# Patient Record
Sex: FEMALE | Race: WHITE | URBAN - METROPOLITAN AREA
[De-identification: names, ages, dates, MRNs, and addresses within clinical notes are randomized per-mention and may not be internally consistent; named-entity substitution may affect disease eponyms.]

---
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
## 2025-01-07 ENCOUNTER — OFFICE VISIT (OUTPATIENT)
Age: 20
End: 2025-01-07

## 2025-01-07 VITALS
SYSTOLIC BLOOD PRESSURE: 130 MMHG | DIASTOLIC BLOOD PRESSURE: 80 MMHG | TEMPERATURE: 97.7 F | OXYGEN SATURATION: 95 % | WEIGHT: 252 LBS | HEART RATE: 71 BPM | BODY MASS INDEX: 46.38 KG/M2 | HEIGHT: 62 IN

## 2025-01-07 DIAGNOSIS — H10.32 ACUTE BACTERIAL CONJUNCTIVITIS OF LEFT EYE: Primary | ICD-10-CM

## 2025-01-07 RX ORDER — PREDNISONE 20 MG/1
40 TABLET ORAL DAILY
Qty: 10 TABLET | Refills: 0 | Status: SHIPPED | OUTPATIENT
Start: 2025-01-07 | End: 2025-01-12

## 2025-01-07 RX ORDER — TOBRAMYCIN 3 MG/ML
1 SOLUTION/ DROPS OPHTHALMIC EVERY 4 HOURS
Qty: 5 ML | Refills: 0 | Status: SHIPPED | OUTPATIENT
Start: 2025-01-07 | End: 2025-01-14

## 2025-01-07 ASSESSMENT — ENCOUNTER SYMPTOMS
RHINORRHEA: 0
EYE REDNESS: 1
SWOLLEN GLANDS: 0
DOUBLE VISION: 0
EYE ITCHING: 1
PHOTOPHOBIA: 0
SORE THROAT: 0
EYE DISCHARGE: 1

## 2025-01-07 NOTE — PROGRESS NOTES
Cielo Rivas (:  2005) is a 19 y.o. female,Established patient, here for evaluation of the following chief complaint(s):  Conjunctivitis (Patient c/o waking up this AM with left eye redness, drainage and pain)      ASSESSMENT/PLAN:  1. Acute bacterial conjunctivitis of left eye    - tobramycin (TOBREX) 0.3 % ophthalmic solution; Place 1 drop into the left eye every 4 hours for 7 days  Dispense: 5 mL; Refill: 0  - predniSONE (DELTASONE) 20 MG tablet; Take 2 tablets by mouth daily for 5 days  Dispense: 10 tablet; Refill: 0     -instruction in AVS.apply warm compress    Return if symptoms worsen or fail to improve.    SUBJECTIVE/OBJECTIVE:    History provided by:  Patient  Conjunctivitis   The current episode started today. The onset was sudden. The problem has been gradually worsening. The problem is mild. Associated symptoms include eye itching, eye discharge and eye redness. Pertinent negatives include no fever, no decreased vision, no double vision, no photophobia, no congestion, no headaches, no rhinorrhea, no sore throat, no swollen glands and no rash.       Vitals:    25 1550   BP: 130/85   Site: Right Upper Arm   Position: Sitting   Cuff Size: Large Adult   Pulse: 71   Temp: 97.7 °F (36.5 °C)   TempSrc: Oral   SpO2: 95%   Weight: 114.3 kg (252 lb)   Height: 1.575 m (5' 2\")       Review of Systems   Constitutional:  Negative for fever.   HENT:  Negative for congestion, rhinorrhea and sore throat.    Eyes:  Positive for discharge, redness and itching. Negative for double vision and photophobia.   Skin:  Negative for rash.   Neurological:  Negative for headaches.       Physical Exam  Constitutional:       General: She is not in acute distress.  HENT:      Nose: No congestion.      Mouth/Throat:      Mouth: Mucous membranes are moist.      Pharynx: No posterior oropharyngeal erythema.   Eyes:      General: Lids are normal.      Conjunctiva/sclera:      Right eye: Right conjunctiva is not injected.

## 2025-03-03 ENCOUNTER — OFFICE VISIT (OUTPATIENT)
Age: 20
End: 2025-03-03

## 2025-03-03 VITALS
DIASTOLIC BLOOD PRESSURE: 72 MMHG | HEIGHT: 62 IN | TEMPERATURE: 98.2 F | BODY MASS INDEX: 47.11 KG/M2 | RESPIRATION RATE: 16 BRPM | SYSTOLIC BLOOD PRESSURE: 120 MMHG | OXYGEN SATURATION: 96 % | WEIGHT: 256 LBS | HEART RATE: 88 BPM

## 2025-03-03 DIAGNOSIS — Z02.1 PHYSICAL EXAM, PRE-EMPLOYMENT: Primary | ICD-10-CM

## 2025-03-03 DIAGNOSIS — Z11.1 TUBERCULOSIS SCREENING: ICD-10-CM

## 2025-03-05 ASSESSMENT — ENCOUNTER SYMPTOMS
EYES NEGATIVE: 1
RESPIRATORY NEGATIVE: 1

## 2025-03-05 NOTE — PROGRESS NOTES
Cieol Rivas (:  2005) is a 20 y.o. female,Established patient, here for evaluation of the following chief complaint(s):  Employment Physical (WORK PHYSICAL AND TB PLACEMENT )      ASSESSMENT/PLAN:  1. Physical exam, pre-employment  - No abnormal findings, generally healthy  2. Tuberculosis screening  - Mantoux testing  -instructed to return in 48 hours for reading and documentation.        Return in about 2 days (around 3/5/2025), or if symptoms worsen or fail to improve.    SUBJECTIVE/OBJECTIVE:  20 year old female reports no pertinent health hx for employment physical. She reports she has had a tonsilectomy years ago.       History provided by:  Patient      Vitals:    25 1202   BP: 120/72   Site: Right Upper Arm   Position: Sitting   Cuff Size: Large Adult   Pulse: 88   Resp: 16   Temp: 98.2 °F (36.8 °C)   TempSrc: Oral   SpO2: 96%   Weight: 116.1 kg (256 lb)   Height: 1.575 m (5' 2\")       Review of Systems   Constitutional:  Negative for activity change and fatigue.   HENT: Negative.     Eyes: Negative.    Respiratory: Negative.     Cardiovascular: Negative.    Endocrine: Negative.    Genitourinary: Negative.    Musculoskeletal: Negative.    Skin: Negative.        Physical Exam  Vitals and nursing note reviewed.   Constitutional:       Appearance: Normal appearance.   HENT:      Head: Normocephalic.      Right Ear: Tympanic membrane normal.      Left Ear: Tympanic membrane normal.      Nose: Nose normal.      Mouth/Throat:      Mouth: Mucous membranes are moist.   Eyes:      Extraocular Movements: Extraocular movements intact.      Pupils: Pupils are equal, round, and reactive to light.   Cardiovascular:      Rate and Rhythm: Normal rate and regular rhythm.      Pulses: Normal pulses.      Heart sounds: Normal heart sounds.   Pulmonary:      Effort: Pulmonary effort is normal.      Breath sounds: Normal breath sounds.   Abdominal:      General: Bowel sounds are normal.      Palpations: Abdomen